# Patient Record
Sex: MALE | Race: WHITE | NOT HISPANIC OR LATINO
[De-identification: names, ages, dates, MRNs, and addresses within clinical notes are randomized per-mention and may not be internally consistent; named-entity substitution may affect disease eponyms.]

---

## 2017-01-23 ENCOUNTER — RX ONLY (OUTPATIENT)
Age: 77
Setting detail: RX ONLY
End: 2017-01-23

## 2021-01-01 ENCOUNTER — OFFICE VISIT (OUTPATIENT)
Dept: URGENT CARE | Facility: CLINIC | Age: 81
End: 2021-01-01
Payer: MEDICARE

## 2021-01-01 VITALS
HEART RATE: 89 BPM | DIASTOLIC BLOOD PRESSURE: 72 MMHG | OXYGEN SATURATION: 93 % | WEIGHT: 184.5 LBS | HEIGHT: 71 IN | BODY MASS INDEX: 25.83 KG/M2 | SYSTOLIC BLOOD PRESSURE: 124 MMHG | TEMPERATURE: 96.3 F | RESPIRATION RATE: 16 BRPM

## 2021-01-01 VITALS
HEART RATE: 69 BPM | BODY MASS INDEX: 26.15 KG/M2 | WEIGHT: 186.8 LBS | SYSTOLIC BLOOD PRESSURE: 120 MMHG | RESPIRATION RATE: 16 BRPM | HEIGHT: 71 IN | TEMPERATURE: 98.1 F | DIASTOLIC BLOOD PRESSURE: 62 MMHG | OXYGEN SATURATION: 92 %

## 2021-01-01 DIAGNOSIS — H65.91 MIDDLE EAR EFFUSION, RIGHT: ICD-10-CM

## 2021-01-01 DIAGNOSIS — J01.40 ACUTE PANSINUSITIS, RECURRENCE NOT SPECIFIED: ICD-10-CM

## 2021-01-01 DIAGNOSIS — H92.01 OTALGIA OF RIGHT EAR: ICD-10-CM

## 2021-01-01 DIAGNOSIS — H61.21 IMPACTED CERUMEN OF RIGHT EAR: ICD-10-CM

## 2021-01-01 PROCEDURE — 99214 OFFICE O/P EST MOD 30 MIN: CPT | Performed by: PHYSICIAN ASSISTANT

## 2021-01-01 PROCEDURE — 99203 OFFICE O/P NEW LOW 30 MIN: CPT | Performed by: PHYSICIAN ASSISTANT

## 2021-01-01 RX ORDER — METHYLPREDNISOLONE 4 MG/1
TABLET ORAL
Qty: 21 TABLET | Refills: 0 | Status: SHIPPED | OUTPATIENT
Start: 2021-01-01 | End: 2022-01-01

## 2021-01-01 RX ORDER — DOXYCYCLINE HYCLATE 100 MG
100 TABLET ORAL 2 TIMES DAILY
Qty: 14 TABLET | Refills: 0 | Status: SHIPPED | OUTPATIENT
Start: 2021-01-01 | End: 2022-01-01

## 2021-01-01 RX ORDER — AMOXICILLIN AND CLAVULANATE POTASSIUM 875; 125 MG/1; MG/1
1 TABLET, FILM COATED ORAL 2 TIMES DAILY
Qty: 14 TABLET | Refills: 0 | Status: SHIPPED | OUTPATIENT
Start: 2021-01-01 | End: 2021-01-01

## 2021-01-01 ASSESSMENT — ENCOUNTER SYMPTOMS
CHILLS: 0
SINUS PAIN: 0
HEADACHES: 0
VOMITING: 0
FEVER: 0
SINUS PAIN: 1
VOMITING: 0
HEADACHES: 1
MYALGIAS: 0
ABDOMINAL PAIN: 0
ABDOMINAL PAIN: 0
SHORTNESS OF BREATH: 0
SORE THROAT: 1
NAUSEA: 0
FEVER: 0
SORE THROAT: 1
MYALGIAS: 0
SPUTUM PRODUCTION: 1
HOARSE VOICE: 1
WHEEZING: 0
COUGH: 1
SINUS PRESSURE: 1
PALPITATIONS: 0
NAUSEA: 0
DIZZINESS: 0
COUGH: 0
CHILLS: 0
SWOLLEN GLANDS: 0

## 2021-12-10 NOTE — PROGRESS NOTES
"Subjective     Kenny Montgomery is a 81 y.o. male who presents with Sinus Problem (sanket ear pain, nasal congestion, sinus pressure x 2 weeks)            Sinus Problem  This is a new problem. Episode onset: 2 weeks  The problem has been gradually worsening since onset. There has been no fever. The pain is moderate. Associated symptoms include congestion (green mucous), coughing (productive ), ear pain (right), headaches, a hoarse voice, sinus pressure and a sore throat. Pertinent negatives include no chills, shortness of breath or swollen glands. Past treatments include nothing.     The patient is fully vaccinated. He denies recent COVID exposure.    No past medical history on file.    No past surgical history on file.    No family history on file.    No Known Allergies      Medications, Allergies, and current problem list reviewed today in Epic    Review of Systems   Constitutional: Negative for chills, fever and malaise/fatigue.   HENT: Positive for congestion (green mucous), ear pain (right), hoarse voice, sinus pressure, sinus pain and sore throat.    Respiratory: Positive for cough (productive ) and sputum production. Negative for shortness of breath and wheezing.    Cardiovascular: Negative for chest pain and leg swelling.   Gastrointestinal: Negative for abdominal pain, nausea and vomiting.   Musculoskeletal: Negative for myalgias.   Skin: Negative for rash.   Neurological: Positive for headaches.     All other systems reviewed and are negative.            Objective     /72 (BP Location: Left arm, Patient Position: Sitting)   Pulse 89   Temp (!) 35.7 °C (96.3 °F) (Temporal)   Resp 16   Ht 1.803 m (5' 11\")   Wt 83.7 kg (184 lb 8 oz)   SpO2 93%   BMI 25.73 kg/m²      Physical Exam  Constitutional:       General: He is not in acute distress.     Appearance: Normal appearance. He is not ill-appearing.   HENT:      Head: Normocephalic and atraumatic.      Right Ear: Tympanic membrane, ear canal and external " ear normal.      Left Ear: Tympanic membrane, ear canal and external ear normal.      Nose: Mucosal edema, congestion and rhinorrhea present.      Right Sinus: Maxillary sinus tenderness and frontal sinus tenderness present.      Left Sinus: Maxillary sinus tenderness and frontal sinus tenderness present.      Mouth/Throat:      Mouth: Mucous membranes are moist.      Pharynx: Uvula midline. Posterior oropharyngeal erythema (mild- cobblestone appearance) present.   Eyes:      Conjunctiva/sclera: Conjunctivae normal.   Cardiovascular:      Rate and Rhythm: Normal rate and regular rhythm.      Heart sounds: Normal heart sounds.   Pulmonary:      Effort: Pulmonary effort is normal. No respiratory distress.      Breath sounds: Normal breath sounds. No wheezing, rhonchi or rales.   Lymphadenopathy:      Cervical: No cervical adenopathy.   Skin:     General: Skin is warm and dry.   Neurological:      General: No focal deficit present.      Mental Status: He is alert and oriented to person, place, and time.   Psychiatric:         Mood and Affect: Mood normal.         Behavior: Behavior normal.         Thought Content: Thought content normal.         Judgment: Judgment normal.                             Assessment & Plan        1. Acute pansinusitis, recurrence not specified  - amoxicillin-clavulanate (AUGMENTIN) 875-125 MG Tab; Take 1 Tablet by mouth 2 times a day for 7 days.  Dispense: 14 Tablet; Refill: 0     Differential diagnoses, Supportive care, and indications for immediate follow-up discussed with patient.   Pathogenesis of diagnosis discussed including typical length and natural progression.   Instructed to return to clinic or nearest emergency department for any change in condition, further concerns, or worsening of symptoms.    The patient demonstrated a good understanding and agreed with the treatment plan.    Racheal Wallace P.A.-C.

## 2021-12-28 NOTE — PROGRESS NOTES
Subjective:   Kenny Montgomery is a 81 y.o. male who presents for Follow-Up (was here 12/10; completed antibiotic course ( amoxicillin-clav); (R) ear and sore throat )      HPI:  This is a very pleasant 81-year-old male presenting to the clinic with right ear pain and occasional sore throat x2 to 3 weeks.  Patient was last seen in clinic on 12/10/2021.  He completed a 7-day course of Augmentin for pansinusitis.  He states his symptoms improved for a few days while on the medication however shortly thereafter returned.  He describes mild sinus congestion.  Sore throat predominantly in the morning and improves throughout the day.  No difficulty swallowing or handling secretions.  He has not been running a fever.  Right ear pain is intermittent.  Occasionally radiates down to the right side of the throat.  Denies any ear discharge or drainage.  No cough, shortness of breath or chest pain.  Has used Tylenol on occasion for the pain which provides moderate improvement.    Review of Systems   Constitutional: Negative for chills, fever and malaise/fatigue.   HENT: Positive for congestion, ear pain and sore throat. Negative for ear discharge, hearing loss, sinus pain and tinnitus.    Respiratory: Negative for cough.    Cardiovascular: Negative for chest pain and palpitations.   Gastrointestinal: Negative for abdominal pain, nausea and vomiting.   Musculoskeletal: Negative for myalgias.   Skin: Negative for rash.   Neurological: Negative for dizziness and headaches.       Medications:    • This patient does not have an active medication from one of the medication groupers.    Allergies: Patient has no known allergies.    Problem List: Kenny Montgomery does not have a problem list on file.    Surgical History:  No past surgical history on file.    Past Social Hx: Kenny Montgomery  reports that he has never smoked. He has never used smokeless tobacco. He reports previous alcohol use. He reports that he does not use drugs.     Past Family  "Hx:  Kenny Montgomery family history is not on file.     Problem list, medications, and allergies reviewed by myself today in Epic.     Objective:     /62 (BP Location: Left arm, Patient Position: Sitting)   Pulse 69   Temp 36.7 °C (98.1 °F) (Temporal)   Resp 16   Ht 1.803 m (5' 11\")   Wt 84.7 kg (186 lb 12.8 oz)   SpO2 92%   BMI 26.05 kg/m²     Physical Exam  Constitutional:       General: He is not in acute distress.     Appearance: Normal appearance. He is not ill-appearing, toxic-appearing or diaphoretic.   HENT:      Head: Normocephalic and atraumatic.      Right Ear: Ear canal and external ear normal. There is impacted cerumen.      Left Ear: Tympanic membrane, ear canal and external ear normal.      Ears:      Comments: Right ear: Cerumen impacted against the right TM.  Able to get small amounts of cerumen out of the canal with ear lavage.  One third of the TM unable to be visualized due to cerumen.  Does seem to be air-fluid levels behind the TM.  Minimally erythematous.  No discharge or drainage present.     Nose: Nose normal. No congestion or rhinorrhea.      Mouth/Throat:      Mouth: Mucous membranes are moist.      Pharynx: No oropharyngeal exudate or posterior oropharyngeal erythema.   Eyes:      Conjunctiva/sclera: Conjunctivae normal.   Cardiovascular:      Rate and Rhythm: Normal rate and regular rhythm.      Pulses: Normal pulses.      Heart sounds: Normal heart sounds.   Pulmonary:      Effort: Pulmonary effort is normal.      Breath sounds: Normal breath sounds. No wheezing.   Musculoskeletal:      Cervical back: Normal range of motion. No muscular tenderness.   Lymphadenopathy:      Cervical: No cervical adenopathy.   Skin:     General: Skin is warm and dry.      Capillary Refill: Capillary refill takes less than 2 seconds.   Neurological:      Mental Status: He is alert.   Psychiatric:         Mood and Affect: Mood normal.         Thought Content: Thought content normal. "           Assessment/Plan:     Comments/MDM:     • Lavage was performed in clinic by the medical assistant.  Small amounts of cerumen able to be removed.  There is still a small impaction against the right TM.  Encouraged OTC Debrox.  Refrain from Q-tip use.  On exam there was fluid levels appreciated behind the right TM.  Minimal erythema present.  We will start the patient on a trial of oral antibiotics and steroids to decrease inflammation and assist with eustachian tube drainage.  Tylenol as needed for pain.  Follow-up in clinic for any persistence or worsening of symptoms.     Diagnosis and associated orders:   1. Otalgia of right ear    - doxycycline (VIBRAMYCIN) 100 MG Tab; Take 1 Tablet by mouth 2 times a day for 7 days.  Dispense: 14 Tablet; Refill: 0  - methylPREDNISolone (MEDROL DOSEPAK) 4 MG Tablet Therapy Pack; Follow schedule on package instructions.  Dispense: 21 Tablet; Refill: 0    2. Impacted cerumen of right ear    3. Middle ear effusion, right           Differential diagnosis, natural history, supportive care, and indications for immediate follow-up discussed.    I personally reviewed prior external notes and test results pertinent to today's visit.     Advised the patient to follow-up with the primary care physician for recheck, reevaluation, and consideration of further management.    Please note that this dictation was created using voice recognition software. I have made reasonable attempt to correct obvious errors, but I expect that there are errors of grammar and possibly content that I did not discover before finalizing the note.    This note was electronically signed by YAA Wing PA-C

## 2022-01-01 ENCOUNTER — TELEPHONE (OUTPATIENT)
Dept: PHYSICAL MEDICINE AND REHAB | Facility: MEDICAL CENTER | Age: 82
End: 2022-01-01
Payer: MEDICARE

## 2022-01-01 ENCOUNTER — APPOINTMENT (OUTPATIENT)
Dept: HEMATOLOGY ONCOLOGY | Facility: MEDICAL CENTER | Age: 82
End: 2022-01-01
Payer: MEDICARE

## 2022-01-01 ENCOUNTER — APPOINTMENT (OUTPATIENT)
Dept: RADIOLOGY | Facility: MEDICAL CENTER | Age: 82
End: 2022-01-01
Attending: PHYSICAL MEDICINE & REHABILITATION
Payer: MEDICARE

## 2022-01-01 ENCOUNTER — HOSPITAL ENCOUNTER (OUTPATIENT)
Facility: MEDICAL CENTER | Age: 82
End: 2022-06-16
Attending: RADIOLOGY | Admitting: RADIOLOGY
Payer: MEDICARE

## 2022-01-01 ENCOUNTER — APPOINTMENT (OUTPATIENT)
Dept: RADIOLOGY | Facility: MEDICAL CENTER | Age: 82
End: 2022-01-01
Attending: NEUROLOGICAL SURGERY
Payer: MEDICARE

## 2022-01-01 VITALS
HEIGHT: 71 IN | BODY MASS INDEX: 24.1 KG/M2 | DIASTOLIC BLOOD PRESSURE: 52 MMHG | WEIGHT: 172.18 LBS | SYSTOLIC BLOOD PRESSURE: 102 MMHG | OXYGEN SATURATION: 90 % | TEMPERATURE: 98 F | HEART RATE: 93 BPM

## 2022-01-01 VITALS
DIASTOLIC BLOOD PRESSURE: 60 MMHG | TEMPERATURE: 98.4 F | SYSTOLIC BLOOD PRESSURE: 112 MMHG | HEIGHT: 71 IN | WEIGHT: 175.93 LBS | OXYGEN SATURATION: 93 % | BODY MASS INDEX: 24.63 KG/M2 | HEART RATE: 71 BPM

## 2022-01-01 VITALS
SYSTOLIC BLOOD PRESSURE: 149 MMHG | HEART RATE: 65 BPM | TEMPERATURE: 96.6 F | OXYGEN SATURATION: 94 % | DIASTOLIC BLOOD PRESSURE: 76 MMHG | BODY MASS INDEX: 24.72 KG/M2 | WEIGHT: 176.59 LBS | RESPIRATION RATE: 18 BRPM | HEIGHT: 71 IN

## 2022-01-01 DIAGNOSIS — R20.0 LEFT LEG NUMBNESS: ICD-10-CM

## 2022-01-01 DIAGNOSIS — Z78.9 IMPAIRED MOBILITY AND ADLS: ICD-10-CM

## 2022-01-01 DIAGNOSIS — M54.16 LUMBAR RADICULOPATHY, CHRONIC: ICD-10-CM

## 2022-01-01 DIAGNOSIS — M54.50 LOW BACK PAIN, UNSPECIFIED BACK PAIN LATERALITY, UNSPECIFIED CHRONICITY, UNSPECIFIED WHETHER SCIATICA PRESENT: ICD-10-CM

## 2022-01-01 DIAGNOSIS — M48.061 SPINAL STENOSIS OF LUMBAR REGION, UNSPECIFIED WHETHER NEUROGENIC CLAUDICATION PRESENT: ICD-10-CM

## 2022-01-01 DIAGNOSIS — Z74.09 IMPAIRED PHYSICAL MOBILITY: ICD-10-CM

## 2022-01-01 DIAGNOSIS — R29.898 LEFT LEG WEAKNESS: ICD-10-CM

## 2022-01-01 DIAGNOSIS — Z74.09 IMPAIRED MOBILITY AND ADLS: ICD-10-CM

## 2022-01-01 DIAGNOSIS — D49.2 LUMBAR SPINE TUMOR: ICD-10-CM

## 2022-01-01 DIAGNOSIS — M54.16 LUMBAR RADICULOPATHY: ICD-10-CM

## 2022-01-01 DIAGNOSIS — M16.10 ARTHRITIS OF HIP: ICD-10-CM

## 2022-01-01 LAB
ERYTHROCYTE [DISTWIDTH] IN BLOOD BY AUTOMATED COUNT: 44.7 FL (ref 35.9–50)
HCT VFR BLD AUTO: 38.6 % (ref 42–52)
HGB BLD-MCNC: 13.1 G/DL (ref 14–18)
INR PPP: 0.96 (ref 0.87–1.13)
MCH RBC QN AUTO: 32 PG (ref 27–33)
MCHC RBC AUTO-ENTMCNC: 33.9 G/DL (ref 33.7–35.3)
MCV RBC AUTO: 94.1 FL (ref 81.4–97.8)
PATHOLOGY CONSULT NOTE: NORMAL
PLATELET # BLD AUTO: 226 K/UL (ref 164–446)
PMV BLD AUTO: 11 FL (ref 9–12.9)
PROTHROMBIN TIME: 12.5 SEC (ref 12–14.6)
RBC # BLD AUTO: 4.1 M/UL (ref 4.7–6.1)
WBC # BLD AUTO: 9 K/UL (ref 4.8–10.8)

## 2022-01-01 PROCEDURE — 20225 BONE BIOPSY TROCAR/NDL DEEP: CPT

## 2022-01-01 PROCEDURE — 88185 FLOWCYTOMETRY/TC ADD-ON: CPT | Mod: 91

## 2022-01-01 PROCEDURE — 88342 IMHCHEM/IMCYTCHM 1ST ANTB: CPT

## 2022-01-01 PROCEDURE — 700105 HCHG RX REV CODE 258: Performed by: RADIOLOGY

## 2022-01-01 PROCEDURE — 72148 MRI LUMBAR SPINE W/O DYE: CPT | Mod: ME

## 2022-01-01 PROCEDURE — 36415 COLL VENOUS BLD VENIPUNCTURE: CPT

## 2022-01-01 PROCEDURE — 85027 COMPLETE CBC AUTOMATED: CPT

## 2022-01-01 PROCEDURE — 99204 OFFICE O/P NEW MOD 45 MIN: CPT | Performed by: PHYSICAL MEDICINE & REHABILITATION

## 2022-01-01 PROCEDURE — 160035 HCHG PACU - 1ST 60 MINS PHASE I

## 2022-01-01 PROCEDURE — 160002 HCHG RECOVERY MINUTES (STAT)

## 2022-01-01 PROCEDURE — 88307 TISSUE EXAM BY PATHOLOGIST: CPT

## 2022-01-01 PROCEDURE — 700111 HCHG RX REV CODE 636 W/ 250 OVERRIDE (IP)

## 2022-01-01 PROCEDURE — 85610 PROTHROMBIN TIME: CPT

## 2022-01-01 PROCEDURE — 88342 IMHCHEM/IMCYTCHM 1ST ANTB: CPT | Mod: XU

## 2022-01-01 PROCEDURE — 99214 OFFICE O/P EST MOD 30 MIN: CPT | Performed by: PHYSICAL MEDICINE & REHABILITATION

## 2022-01-01 PROCEDURE — 88311 DECALCIFY TISSUE: CPT

## 2022-01-01 PROCEDURE — 160046 HCHG PACU - 1ST 60 MINS PHASE II

## 2022-01-01 PROCEDURE — 700111 HCHG RX REV CODE 636 W/ 250 OVERRIDE (IP): Performed by: RADIOLOGY

## 2022-01-01 PROCEDURE — 88184 FLOWCYTOMETRY/ TC 1 MARKER: CPT

## 2022-01-01 PROCEDURE — 88341 IMHCHEM/IMCYTCHM EA ADD ANTB: CPT | Mod: XE

## 2022-01-01 RX ORDER — OXYCODONE HYDROCHLORIDE 5 MG/1
2.5 TABLET ORAL
Status: DISCONTINUED | OUTPATIENT
Start: 2022-01-01 | End: 2022-01-01 | Stop reason: HOSPADM

## 2022-01-01 RX ORDER — MIDAZOLAM HYDROCHLORIDE 1 MG/ML
.5-2 INJECTION INTRAMUSCULAR; INTRAVENOUS PRN
Status: DISCONTINUED | OUTPATIENT
Start: 2022-01-01 | End: 2022-01-01 | Stop reason: HOSPADM

## 2022-01-01 RX ORDER — SODIUM CHLORIDE 9 MG/ML
100 INJECTION, SOLUTION INTRAVENOUS CONTINUOUS
Status: DISCONTINUED | OUTPATIENT
Start: 2022-01-01 | End: 2022-01-01 | Stop reason: HOSPADM

## 2022-01-01 RX ORDER — MIDAZOLAM HYDROCHLORIDE 1 MG/ML
INJECTION INTRAMUSCULAR; INTRAVENOUS
Status: COMPLETED
Start: 2022-01-01 | End: 2022-01-01

## 2022-01-01 RX ORDER — SODIUM CHLORIDE 9 MG/ML
500 INJECTION, SOLUTION INTRAVENOUS
Status: DISCONTINUED | OUTPATIENT
Start: 2022-01-01 | End: 2022-01-01 | Stop reason: HOSPADM

## 2022-01-01 RX ORDER — ONDANSETRON 2 MG/ML
4 INJECTION INTRAMUSCULAR; INTRAVENOUS PRN
Status: DISCONTINUED | OUTPATIENT
Start: 2022-01-01 | End: 2022-01-01 | Stop reason: HOSPADM

## 2022-01-01 RX ORDER — LIDOCAINE HYDROCHLORIDE 10 MG/ML
INJECTION, SOLUTION EPIDURAL; INFILTRATION; INTRACAUDAL; PERINEURAL
Status: DISCONTINUED
Start: 2022-01-01 | End: 2022-01-01 | Stop reason: HOSPADM

## 2022-01-01 RX ORDER — GABAPENTIN 100 MG/1
100 CAPSULE ORAL 3 TIMES DAILY PRN
Qty: 90 CAPSULE | Refills: 5 | Status: ON HOLD | OUTPATIENT
Start: 2022-01-01 | End: 2022-01-01

## 2022-01-01 RX ADMIN — SODIUM CHLORIDE 100 ML: 9 INJECTION, SOLUTION INTRAVENOUS at 07:39

## 2022-01-01 RX ADMIN — FENTANYL CITRATE 50 MCG: 50 INJECTION, SOLUTION INTRAMUSCULAR; INTRAVENOUS at 10:13

## 2022-01-01 RX ADMIN — MIDAZOLAM HYDROCHLORIDE 0.5 MG: 1 INJECTION, SOLUTION INTRAMUSCULAR; INTRAVENOUS at 10:15

## 2022-01-01 RX ADMIN — MIDAZOLAM HYDROCHLORIDE 1 MG: 1 INJECTION, SOLUTION INTRAMUSCULAR; INTRAVENOUS at 10:13

## 2022-01-01 ASSESSMENT — PAIN SCALES - GENERAL
PAINLEVEL: 10=SEVERE PAIN
PAINLEVEL: 10=SEVERE PAIN

## 2022-01-01 ASSESSMENT — PATIENT HEALTH QUESTIONNAIRE - PHQ9
CLINICAL INTERPRETATION OF PHQ2 SCORE: 0
CLINICAL INTERPRETATION OF PHQ2 SCORE: 0

## 2022-06-01 NOTE — PROGRESS NOTES
New patient note    Interventional spine and Pain  Physiatry (physical medicine and  Rehabilitation)     Date of service: See epic    Chief complaint:   Chief Complaint   Patient presents with   • New Patient     Back pain        Referring provider: Taj Marlow M.D.     HISTORY    HPI: Kenny Montgomery 81 y.o.  who presents today with Diagnoses of Lumbar radiculopathy, Left leg weakness, Left leg numbness, Impaired mobility and ADLs, Impaired physical mobility, and Arthritis of hip were pertinent to this visit.    HPI    Chronic intermittent left-sided hip pain and low back pain which can radiate down the anterior aspect of the left leg towards the knee with associated numbness tingling burning sensation.  Is typically worse with walking improves with sitting.  Shopping cart sign is positive.  He can walk for about 300 feet and has to stop.  This is been worsening especially over the past month. Severe pain, 10/10 with walking.      Psychological testing for pain as depression and pain commonly coexist and need to both be treated.     Opioid Risk Score: 0      Interpretation of Opioid Risk Score   Score 0-3 = Low risk of abuse. Do UDS at least once per year.  Score 4-7 = Moderate risk of abuse. Do UDS 1-4 times per year.  Score 8+ = High risk of abuse. Refer to specialist.    PHQ  Depression Screen (PHQ-2/PHQ-9) 6/1/2022   PHQ-2 Total Score 0       Interpretation of PHQ-9 Total Score   Score Severity   1-4 No Depression   5-9 Mild Depression   10-14 Moderate Depression   15-19 Moderately Severe Depression   20-27 Severe Depression     Medical records review:  I reviewed the note from the referring provider Taj Marlow M.D. including the note dated 5/3/2022.  It was noted to have some arthritis in the hip however there was concern for more trochanteric bursitis or spine etiology of the patient's pain..           ROS:   Red Flags ROS:   Fever, Chills, Sweats: Denies  Involuntary Weight Loss: Denies  Bladder  Incontinence: Denies  Bowel Incontinence: denies  Saddle Anesthesia: Denies    All other systems reviewed and negative.       PMHx:   History reviewed. No pertinent past medical history.      Current Outpatient Medications on File Prior to Visit   Medication Sig Dispense Refill   • Naproxen Sodium (ALEVE PO) Take  by mouth.       No current facility-administered medications on file prior to visit.        PSHx:   History reviewed. No pertinent surgical history.    Family history   History reviewed. No pertinent family history.      Medications: reviewed on epic.   Outpatient Medications Marked as Taking for the 6/1/22 encounter (Office Visit) with Desmond Bocanegra M.D.   Medication Sig Dispense Refill   • Naproxen Sodium (ALEVE PO) Take  by mouth.     • gabapentin (NEURONTIN) 100 MG Cap Take 1 Capsule by mouth 3 times a day as needed (pain). 90 Capsule 5        Allergies:   No Known Allergies    Social Hx:   Social History     Socioeconomic History   • Marital status:      Spouse name: Not on file   • Number of children: Not on file   • Years of education: Not on file   • Highest education level: Not on file   Occupational History   • Not on file   Tobacco Use   • Smoking status: Never Smoker   • Smokeless tobacco: Never Used   Vaping Use   • Vaping Use: Never used   Substance and Sexual Activity   • Alcohol use: Not Currently   • Drug use: Never   • Sexual activity: Not on file   Other Topics Concern   • Not on file   Social History Narrative   • Not on file     Social Determinants of Health     Financial Resource Strain: Not on file   Food Insecurity: Not on file   Transportation Needs: Not on file   Physical Activity: Not on file   Stress: Not on file   Social Connections: Not on file   Intimate Partner Violence: Not on file   Housing Stability: Not on file         EXAMINATION     Physical Exam:   Vitals: /52 (BP Location: Right arm, Patient Position: Sitting, BP Cuff Size: Adult)   Pulse 93   Temp  "36.7 °C (98 °F) (Temporal)   Ht 1.803 m (5' 11\")   Wt 78.1 kg (172 lb 2.9 oz)   SpO2 90%     Constitutional:   Body Habitus: Body mass index is 24.01 kg/m².  Cooperation: Fully cooperates with exam  Appearance: Well-groomed, well-nourished, not disheveled     Eyes: No scleral icterus to suggest severe liver disease, no proptosis to suggest severe hyperthyroid    ENT -no obvious auditory deficits, no obvious tongue lesions, tongue midline, no facial droop     Skin -no rashes or lesions noted     Respiratory-  breathing comfortable on room air, no audible wheezing    Cardiovascular- capillary refills less than 2 seconds.     Psychiatric- alert and oriented ×3. Normal affect.       Musculoskeletal and Neuro -           Thoracic/Lumbar Spine/Sacral Spine/Hips   Inspection: No evidence of atrophy in bilateral lower extremities throughout     ROM: decreased active range of motion with flexion, lateral flexion, and rotation bilaterally.   There is decreased active range of motion with lumbar extension with pain.    There is pain with facet loading maneuver (extension rotation) with axial low back pain on the BILATERAL side(s)    Palpation:   No tenderness to palpation in midline at T1-T12 levels. No tenderness to palpation in the left and right of the midline T1-L5, NEGATIVE for tenderness to palpation to the para-midline region in the lower lumbar levels.  palpation over SI joint: negative right, positive left    palpation in hip or over the gluteus medius tendon insertion: negative bilaterally      Lumbar spine Special tests  Neuro tension  Straight leg test negative right, positive left    Slump test negative right, positive left      HIP  FAIR test negative right, mildly positive left    Range of motion in the RIGHT hip is full  in flexion, extension, abduction, internal rotation, external rotation.  Range of motion in the LEFT hip is mildly decreased in flexion, extension, abduction, internal rotation, external " rotation.      SI joint tests  SI joint compression negative bilaterally    SI joint distraction negative bilaterally    Thigh thrust test negative right, positive left    SHAMEKA test negative right, positive left                 Key points for the international standards for neurological classification of spinal cord injury (ISNCSCI) to light touch.     Dermatome R L                                      L2 2 2   L3 2 1   L4 2 2   L5 2 2   S1 2 2   S2 2 2       Motor Exam Lower Extremities    ? Myotome R L   Hip flexion L2 5 5   Knee extension L3 5 4   Ankle dorsiflexion L4 5 5   Toe extension L5 5 5   Ankle plantarflexion S1 5 5         Reflexes  ?  R L                Patella  2+ 2+   Achilles   2+ 2+       Babinski sign negative bilaterally   Clonus of the ankle negative bilaterally       MEDICAL DECISION MAKING    Medical records review: see under HPI section.     DATA    Labs:   Lab Results   Component Value Date/Time    SODIUM 133 (L) 09/23/2005 02:20 AM    POTASSIUM 4.2 09/23/2005 02:20 AM    CHLORIDE 104 09/23/2005 02:20 AM    CO2 22 09/23/2005 02:20 AM    GLUCOSE 151 (H) 09/23/2005 02:20 AM    BUN 10 09/23/2005 02:20 AM    CREATININE 0.8 09/23/2005 02:20 AM    CALCIUM 7.8 (L) 09/23/2005 02:20 AM    ASTSGOT 55 (H) 09/21/2005 10:35 PM    ALTSGPT 24 09/21/2005 10:35 PM    TBILIRUBIN 1.1 09/21/2005 10:35 PM    ALBUMIN 3.4 09/21/2005 10:35 PM    TOTPROTEIN 6.5 09/21/2005 10:35 PM    GLOBULIN 3.1 09/21/2005 10:35 PM    AGRATIO 1.1 09/21/2005 10:35 PM   ]    Lab Results   Component Value Date/Time    PROTHROMBTM 10.9 (L) 09/22/2005 05:50 AM    INR 0.87 09/22/2005 05:50 AM        Lab Results   Component Value Date/Time    WBC 14.4 (H) 09/23/2005 02:20 AM    RBC 3.45 (L) 09/23/2005 02:20 AM    HEMOGLOBIN 10.9 (L) 09/23/2005 02:20 AM    HEMATOCRIT 32.9 (L) 09/23/2005 02:20 AM    MCV 95.5 09/23/2005 02:20 AM    MCH 31.7 09/23/2005 02:20 AM    MCHC 33.2 09/23/2005 02:20 AM    MPV 11.0 (H) 09/23/2005 02:20 AM     NEUTSPOLYS 66.9 09/21/2005 10:35 PM    LYMPHOCYTES 25.4 09/21/2005 10:35 PM    MONOCYTES 5.7 09/21/2005 10:35 PM    EOSINOPHILS 1.4 09/21/2005 10:35 PM    BASOPHILS 0.6 09/21/2005 10:35 PM        No results found for: HBA1C     Imaging:   I personally reviewed following images, these are my reads          IMAGING radiology reads. I reviewed the following radiology reads                                                   X-ray left hip 5/3/2022  An AP pelvis and AP and lateral of his left hip show some arthritic   changes, mostly central joint space narrowing, osteophyte formation.  Good   preservation of the joint space in the weightbearing dome                                                            Diagnosis   Visit Diagnoses     ICD-10-CM   1. Lumbar radiculopathy  M54.16   2. Left leg weakness  R29.898   3. Left leg numbness  R20.0   4. Impaired mobility and ADLs  Z74.09    Z78.9   5. Impaired physical mobility  Z74.09   6. Arthritis of hip  M16.10           ASSESSMENT AND PLAN:  Kenny Montgomery 81 y.o. male      Kenny was seen today for new patient.    Diagnoses and all orders for this visit:    Lumbar radiculopathy  -     Referral to establish with [facility] PCP  -     DX-LUMBAR SPINE-2 OR 3 VIEWS; Future  -     MR-LUMBAR SPINE-W/O; Future  -     gabapentin (NEURONTIN) 100 MG Cap; Take 1 Capsule by mouth 3 times a day as needed (pain).    Left leg weakness  -     Referral to establish with [facility] PCP  -     DX-LUMBAR SPINE-2 OR 3 VIEWS; Future  -     MR-LUMBAR SPINE-W/O; Future    Left leg numbness  -     Referral to establish with [facility] PCP  -     DX-LUMBAR SPINE-2 OR 3 VIEWS; Future  -     MR-LUMBAR SPINE-W/O; Future    Impaired mobility and ADLs  -     Referral to establish with [facility] PCP    Impaired physical mobility  -     Referral to establish with [facility] PCP    Arthritis of hip  -     Referral to establish with [facility] PCP      The patient has significant functional deficits with difficulty with ADLs  likely secondary to lumbar radiculopathy and spinal stenosis.    Diagnostic workup: As above    Medications:   As above     Interventional program: I would consider the patient for an epidural steroid injection depending on the results of the above         Follow-up: After the above diagnostic studies           Please note that this dictation was created using voice recognition software. I have made every reasonable attempt to correct obvious errors but there may be errors of grammar and content that I may have overlooked prior to finalization of this note.      Desmond Bocanegra MD  Physical Medicine and Rehabilitation  Interventional Spine and Sports Physiatry  [facility]            Taj Marlow M.D.

## 2022-06-06 NOTE — PROGRESS NOTES
I called the patient to discuss the case however he did not answer the call.  We will continue to call the patient and offer a sooner appointment for discussion.  I discussed the case with the radiologist who stated there is a spinal tumor.

## 2022-06-08 NOTE — PROGRESS NOTES
Follow up patient note  Interventional spine and Pain  Physiatry (physical medicine and  Rehabilitation)       Chief complaint:   Chief Complaint   Patient presents with   • Follow-Up     Back pain          HISTORY    Please see new patient note by Dr Bocanegra,  for more details.     HPI  Patient identification: Kenny Montgomery , JR 1940,   With Diagnoses of Spinal stenosis of lumbar region , Lumbar radiculopathy, Left leg weakness, Left leg numbness, Impaired mobility and ADLs, Impaired physical mobility, and Lumbar spine tumor were pertinent to this visit.     The patient continues to have left-sided low back and hip pain which radiates down the anterior aspect of the left leg associated with numbness tingling burning.  With functional deficits with difficulty with walking.  Pain is severe 10 out of 10 in intensity.         ROS Red Flags :   Fever, Chills, Sweats: Denies  Involuntary Weight Loss: Denies  Bowel/Bladder Incontinence: Denies  Saddle Anesthesia: Denies        PMHx:   History reviewed. No pertinent past medical history.    PSHx:   History reviewed. No pertinent surgical history.    Family history   History reviewed. No pertinent family history.      Medications:   Outpatient Medications Marked as Taking for the 22 encounter (Office Visit) with Desmond Bocanegra M.D.   Medication Sig Dispense Refill   • Naproxen Sodium (ALEVE PO) Take  by mouth.     • gabapentin (NEURONTIN) 100 MG Cap Take 1 Capsule by mouth 3 times a day as needed (pain). 90 Capsule 5        Current Outpatient Medications on File Prior to Visit   Medication Sig Dispense Refill   • Naproxen Sodium (ALEVE PO) Take  by mouth.     • gabapentin (NEURONTIN) 100 MG Cap Take 1 Capsule by mouth 3 times a day as needed (pain). 90 Capsule 5     No current facility-administered medications on file prior to visit.         Allergies:   No Known Allergies    Social Hx:   Social History     Socioeconomic History   • Marital status:       "Spouse name: Not on file   • Number of children: Not on file   • Years of education: Not on file   • Highest education level: Not on file   Occupational History   • Not on file   Tobacco Use   • Smoking status: Never Smoker   • Smokeless tobacco: Never Used   Vaping Use   • Vaping Use: Never used   Substance and Sexual Activity   • Alcohol use: Not Currently   • Drug use: Never   • Sexual activity: Not on file   Other Topics Concern   • Not on file   Social History Narrative   • Not on file     Social Determinants of Health     Financial Resource Strain: Not on file   Food Insecurity: Not on file   Transportation Needs: Not on file   Physical Activity: Not on file   Stress: Not on file   Social Connections: Not on file   Intimate Partner Violence: Not on file   Housing Stability: Not on file         EXAMINATION     Physical Exam:   Vitals: /60 (BP Location: Left arm, Patient Position: Sitting, BP Cuff Size: Adult)   Pulse 71   Temp 36.9 °C (98.4 °F) (Temporal)   Ht 1.803 m (5' 11\")   Wt 79.8 kg (175 lb 14.8 oz)   SpO2 93%     Constitutional:   Body Habitus: Body mass index is 24.54 kg/m².  Cooperation: Fully cooperates with exam  Appearance: Well-groomed no disheveled    Respiratory-  breathing comfortable on room air, no audible wheezing  Cardiovascular- capillary refills less than 2 seconds. No lower extremity edema is noted.   Psychiatric- alert and oriented ×3. Normal affect.    MSK and Neuro: -       Thoracic/Lumbar Spine/Sacral Spine/Hips     decreased active range of motion with flexion, lateral flexion, and rotation bilaterally.   There is decreased active range of motion with lumbar extension.      Palpation:   No tenderness to palpation in midline at T1-T12 levels. No tenderness to palpation in the left and right of the midline T1-L5, NEGATIVE for tenderness to palpation to the para-midline region in the lower lumbar levels.  palpation over SI joint: negative bilaterally    palpation in hip or " over the gluteus medius tendon insertion: negative bilaterally      Lumbar spine Special tests  Neuro tension  Straight leg test negative right, positive left    Slump test negative right, positive left      Key points for the international standards for neurological classification of spinal cord injury (ISNCSCI) to light touch.     Dermatome R L                                      L2 2 2   L3 2 1   L4 2 2   L5 2 2   S1 2 2   S2 2 2         Motor Exam Lower Extremities    ? Myotome R L   Hip flexion L2 5 5   Knee extension L3 5 4   Ankle dorsiflexion L4 5 5   Toe extension L5 5 5   Ankle plantarflexion S1 5 5           MEDICAL DECISION MAKING    DATA    Labs:   Lab Results   Component Value Date/Time    SODIUM 133 (L) 09/23/2005 02:20 AM    POTASSIUM 4.2 09/23/2005 02:20 AM    CHLORIDE 104 09/23/2005 02:20 AM    CO2 22 09/23/2005 02:20 AM    GLUCOSE 151 (H) 09/23/2005 02:20 AM    BUN 10 09/23/2005 02:20 AM    CREATININE 0.8 09/23/2005 02:20 AM        Lab Results   Component Value Date/Time    PROTHROMBTM 10.9 (L) 09/22/2005 05:50 AM    INR 0.87 09/22/2005 05:50 AM        Lab Results   Component Value Date/Time    WBC 14.4 (H) 09/23/2005 02:20 AM    RBC 3.45 (L) 09/23/2005 02:20 AM    HEMOGLOBIN 10.9 (L) 09/23/2005 02:20 AM    HEMATOCRIT 32.9 (L) 09/23/2005 02:20 AM    MCV 95.5 09/23/2005 02:20 AM    MCH 31.7 09/23/2005 02:20 AM    MCHC 33.2 09/23/2005 02:20 AM    MPV 11.0 (H) 09/23/2005 02:20 AM    NEUTSPOLYS 66.9 09/21/2005 10:35 PM    LYMPHOCYTES 25.4 09/21/2005 10:35 PM    MONOCYTES 5.7 09/21/2005 10:35 PM    EOSINOPHILS 1.4 09/21/2005 10:35 PM    BASOPHILS 0.6 09/21/2005 10:35 PM        No results found for: HBA1C       Imaging:   I personally reviewed following images    MRI lumbar spine 6/ 6/ 2022 with a large mass around L3 posterior elements.        I reviewed the following radiology reports                         Results for orders placed in visit on 06/06/22    MR-LUMBAR SPINE-W/O    Impression  1.   Large destructive mass centered within the L3 posterior elements, left greater than right extending into the left pedicle. There is extraosseous tumor with posterior left epidural involvement producing at least moderate spinal stenosis. There is  severe left L3-L4 foraminal narrowing with likely involvement of the left L3 nerve.  2.  Findings are consistent with malignancy and may represent plasmacytoma/myeloma or metastatic disease. This would be amenable to CT-guided biopsy.  3.  No other definite lesion on this noncontrast MRI.  4.  Multilevel degenerative changes as detailed above.  These findings were discussed with MARCELLA MCCAIN on 2022 3:20 PM.        Results for orders placed in visit on 22    MR-LUMBAR SPINE-W/O    Impression  1.  Large destructive mass centered within the L3 posterior elements, left greater than right extending into the left pedicle. There is extraosseous tumor with posterior left epidural involvement producing at least moderate spinal stenosis. There is  severe left L3-L4 foraminal narrowing with likely involvement of the left L3 nerve.  2.  Findings are consistent with malignancy and may represent plasmacytoma/myeloma or metastatic disease. This would be amenable to CT-guided biopsy.  3.  No other definite lesion on this noncontrast MRI.  4.  Multilevel degenerative changes as detailed above.  These findings were discussed with MARCELLA MCCAIN on 2022 3:20 PM.                                                                                                                                       DIAGNOSIS   Visit Diagnoses     ICD-10-CM   1. Spinal stenosis of lumbar region   M48.061   2. Lumbar radiculopathy  M54.16   3. Left leg weakness  R29.898   4. Left leg numbness  R20.0   5. Impaired mobility and ADLs  Z74.09    Z78.9   6. Impaired physical mobility  Z74.09   7. Lumbar spine tumor  D49.2         ASSESSMENT and PLAN:     Kenny Montgomery  1940 male      Kenny was  seen today for follow-up.    Diagnoses and all orders for this visit:    Spinal stenosis of lumbar region   -     Cancel: Referral to [facility]  -     Referral to Spine Surgery    Lumbar radiculopathy  -     Cancel: Referral to [facility]  -     Referral to Spine Surgery    Left leg weakness  -     Cancel: Referral to [facility]  -     Referral to Spine Surgery    Left leg numbness  -     Cancel: Referral to [facility]  -     Referral to Spine Surgery    Impaired mobility and ADLs  -     Cancel: Referral to [facility]  -     Referral to Spine Surgery    Impaired physical mobility  -     Cancel: Referral to [facility]  -     Referral to Spine Surgery    Lumbar spine tumor  -     Cancel: Referral to [facility]  -     Referral to Spine Surgery  -     Referral to [facility]      I reviewed the case with the dictating radiologist.  There is a large spinal mass in the posterior elements around L3.  This is consistent with the patient's symptoms.  I referred the patient to oncology as well as spine surgery.  I also referred the patient to a pain specialist to routinely sees cancer related pain.    I previously referred the patient to primary care to establish care with a primary care physician.  I provided the patient with information for how to contact primary care.  I will be transferring the patient care to the above specialist.      Thank you for allowing me to participate in the care of this patient. If you have any questions please not hesitate to contact me.             Please note that this dictation was created using voice recognition software. I have made every reasonable attempt to correct obvious errors but there may be errors of grammar and content that I may have overlooked prior to finalization of this note.      Desmond Bocanegra MD  Interventional Spine and Sports Physiatry  Physical Medicine and Rehabilitation  [facility]

## 2022-06-16 NOTE — DISCHARGE INSTRUCTIONS
ACTIVITY: Rest and take it easy for the first 24 hours.  A responsible adult is recommended to remain with you during that time.  It is normal to feel sleepy.  We encourage you to not do anything that requires balance, judgment or coordination.    MILD FLU-LIKE SYMPTOMS ARE NORMAL. YOU MAY EXPERIENCE GENERALIZED MUSCLE ACHES, THROAT IRRITATION, HEADACHE AND/OR SOME NAUSEA.    FOR 24 HOURS DO NOT:  Drive, operate machinery or run household appliances.  Drink beer or alcoholic beverages.   Make important decisions or sign legal documents.    SPECIAL INSTRUCTIONS:   Needle Biopsy of the Bone  A needle biopsy of the bone is a procedure in which a small sample of bone is removed. The sample is taken with a needle. Then, the bone sample is looked at under a microscope to check for abnormalities. The sample is usually taken from a bone that is close to the skin. This procedure may be done to check for various problems with the bone. You may need this procedure if imaging tests or blood tests have indicated a possible problem. This procedure may be done to help determine if a bone tumor is cancerous (malignant). A bone biopsy can help to diagnose problems such as:  Tumors of the bone (sarcomas) and bone marrow (multiple myeloma).  Bone that forms abnormally (Paget's disease).  Noncancerous (benign) bone cysts.  Bony growths.  Infections in the bone.  Tell a health care provider about:  Any allergies you have.  All medicines you are taking, including vitamins, herbs, eye drops, creams, and over-the-counter medicines.  Any problems you or family members have had with anesthetic medicines.  Any blood disorders you have.  Any surgeries you have had.  Any medical conditions you have.  What are the risks?  Generally, this is a safe procedure. However, problems may occur, including:  Excessive bleeding.  Infection.  Injury to surrounding tissue.  What happens before the procedure?  Medicines  Ask your health care provider  about:  Changing or stopping your regular medicines. This is especially important if you are taking diabetes medicines or blood thinners.  Taking medicines such as aspirin and ibuprofen. These medicines can thin your blood. Do not take these medicines unless your health care provider tells you to take them.  Taking over-the-counter medicines, vitamins, herbs, and supplements.  General instructions  Follow instructions from your health care provider about eating or drinking restrictions.  Plan to have someone take you home from the hospital or clinic.  If you will be going home right after the procedure, plan to have someone with you for 24 hours.  What happens during the procedure?    An IV may be inserted into one of your veins.  The injection site will be cleaned with a germ-killing solution (antiseptic).  You may be given one or more of the following:  A medicine to help you relax (sedative).  A medicine to numb the area (local anesthetic).  The sample of bone will be removed by inserting a large needle through the skin and into the bone.  The needle will be removed.  Tissue from the needle will be sent to a laboratory to be analyzed.  A bandage (dressing) will be placed over the insertion site.  The procedure may vary among health care providers and hospitals.  What happens after the procedure?  Your blood pressure, heart rate, breathing rate, and blood oxygen level may be monitored until you leave the hospital or clinic.  Do not drive for 24 hours if you were given a sedative during your procedure.  You may have numbness and loss of feeling in the area for a few hours after the test.  It is up to you to get the results of your procedure. Ask your health care provider, or the department that is doing the procedure, when your results will be ready.  Summary  A needle biopsy of the bone is a procedure in which a small sample of bone is removed with a needle.  This procedure may be done to check for various  problems with the bone.  Ask your health care provider about changing or stopping your regular medicines before the procedure.  Ask your health care provider, or the department that is doing the procedure, when your results will be ready.        DIET: To avoid nausea, slowly advance diet as tolerated, avoiding spicy or greasy foods for the first day.  Add more substantial food to your diet according to your physician's instructions.  Babies can be fed formula or breast milk as soon as they are hungry.  INCREASE FLUIDS AND FIBER TO AVOID CONSTIPATION.    SURGICAL DRESSING/BATHING: remove dressing in 24 hours, ok to shower. No baths, hot tubs or swimming pools for 5 days.    FOLLOW-UP APPOINTMENT:  A follow-up appointment should be arranged with your doctor in 1-2 weeks call to schedule.    You should CALL YOUR PHYSICIAN if you develop:  Fever greater than 101 degrees F.  Pain not relieved by medication, or persistent nausea or vomiting.  Excessive bleeding (blood soaking through dressing) or unexpected drainage from the wound.  Extreme redness or swelling around the incision site, drainage of pus or foul smelling drainage.  Inability to urinate or empty your bladder within 8 hours.  Problems with breathing or chest pain.    You should call 911 if you develop problems with breathing or chest pain.  If you are unable to contact your doctor or surgical center, you should go to the nearest emergency room or urgent care center.  Physician's telephone #: [phone]    If any questions arise, call your doctor.  If your doctor is not available, please feel free to call the Surgical Center at [phone].     A registered nurse may call you a few days after your surgery to see how you are doing after your procedure.    MEDICATIONS: Resume taking daily medication.  Take prescribed pain medication with food.  If no medication is prescribed, you may take non-aspirin pain medication if needed.  PAIN MEDICATION CAN BE VERY  CONSTIPATING.  Take a stool softener or laxative such as senokot, pericolace, or milk of magnesia if needed.     Last pain medication given at 10:13 am.    If your physician has prescribed pain medication that includes Acetaminophen (Tylenol), do not take additional Acetaminophen (Tylenol) while taking the prescribed medication.    Depression / Suicide Risk    As you are discharged from this [facility] facility, it is important to learn how to keep safe from harming yourself.    Recognize the warning signs:  Abrupt changes in personality, positive or negative- including increase in energy   Giving away possessions  Change in eating patterns- significant weight changes-  positive or negative  Change in sleeping patterns- unable to sleep or sleeping all the time   Unwillingness or inability to communicate  Depression  Unusual sadness, discouragement and loneliness  Talk of wanting to die  Neglect of personal appearance   Rebelliousness- reckless behavior  Withdrawal from people/activities they love  Confusion- inability to concentrate     If you or a loved one observes any of these behaviors or has concerns about self-harm, here's what you can do:  Talk about it- your feelings and reasons for harming yourself  Remove any means that you might use to hurt yourself (examples: pills, rope, extension cords, firearm)  Get professional help from the community (Mental Health, Substance Abuse, psychological counseling)  Do not be alone:Call your Safe Contact- someone whom you trust who will be there for you.  Call your local CRISIS HOTLINE 689-4816 or 041-601-7360  Call your local Children's Mobile Crisis Response Team [place] (722) 390-5829 or www.streamit  Call the toll free National Suicide Prevention Hotlines   National Suicide Prevention Lifeline 422-907-EKSA (1586)  National Hope Line Network 800-SUICIDE (535-5152)

## 2022-06-16 NOTE — OR NURSING
From phase 1 by azra, patient is aaox4, vss. Dressing to lower back is clean dry and intact. Patient denies nausea, discharge instructions given. Grandson is downstairs to .

## 2022-06-16 NOTE — PROGRESS NOTES
Patient to IR for CT guided biopsy of lumbar L3/L4 lesion/mass with moderate sedation.  Patient met and identified in [facility] PreOp her hospital standards.  Patient chart/allergies/labs/meds/history all reviewed.  Consent confirmed and present on the chart.  Patient confirms procedure has been explained to him and he has verbalized understanding and has no new questions at this time.  Patient brought to CT Scanner for procedure and placed on table in prone position.  All safety/monitoring/positioning equipment present and in use.  Please see Epic flowsheets/IR narrator for complete case details.  SDJ      1023 - Successful completion of procedure as outlined above.  Patient tolerated procedure and sedation well and without incident.  Multiple specimens sent for pathology and flow cytometry. Gauze and Tegaderm dressing applied to procedure site.  Report called to [facility] PACU RN and patient transported to PACU by this RN alert and oriented yet drowsy, in NAD and VSS.  PING

## 2022-06-16 NOTE — OR SURGEON
Immediate Post- Operative Note        Findings: L3 bone biopsy, 3 samples obtained.      Procedure(s): Bone biopsy, mass at L3      Estimated Blood Loss: ~ 10 ml.        Complications: None            6/16/2022     10:28 AM     Comfort Aldridge M.D.

## 2022-06-16 NOTE — OR NURSING
PACU note- Respirations easy.  Tegaderm and gauze clean and dry to low back.  Patient's pain is low at this time.  No nausea.  VSS.  Report to PACU 2.

## 2022-06-22 NOTE — TELEPHONE ENCOUNTER
Pt daughter called and she is asking if someone can discuss the result from his biopsy.    Thank you  Erica

## 2022-06-22 NOTE — TELEPHONE ENCOUNTER
The biopsy was ordered by spine surgery.  Please discuss with their office for the results.    Dr. Bocanegra

## 2022-11-08 ENCOUNTER — PATIENT MESSAGE (OUTPATIENT)
Dept: HEALTH INFORMATION MANAGEMENT | Facility: OTHER | Age: 82
End: 2022-11-08